# Patient Record
Sex: FEMALE | Race: WHITE | URBAN - METROPOLITAN AREA
[De-identification: names, ages, dates, MRNs, and addresses within clinical notes are randomized per-mention and may not be internally consistent; named-entity substitution may affect disease eponyms.]

---

## 2021-11-10 ENCOUNTER — OFFICE VISIT (OUTPATIENT)
Dept: URBAN - METROPOLITAN AREA CLINIC 54 | Facility: CLINIC | Age: 66
End: 2021-11-10

## 2021-11-12 ENCOUNTER — OFFICE VISIT (OUTPATIENT)
Dept: URBAN - METROPOLITAN AREA CLINIC 54 | Facility: CLINIC | Age: 66
End: 2021-11-12
Payer: MEDICARE

## 2021-11-12 ENCOUNTER — WEB ENCOUNTER (OUTPATIENT)
Dept: URBAN - METROPOLITAN AREA CLINIC 54 | Facility: CLINIC | Age: 66
End: 2021-11-12

## 2021-11-12 VITALS
HEART RATE: 94 BPM | WEIGHT: 114.4 LBS | DIASTOLIC BLOOD PRESSURE: 88 MMHG | TEMPERATURE: 98.6 F | SYSTOLIC BLOOD PRESSURE: 140 MMHG | BODY MASS INDEX: 17.96 KG/M2 | HEIGHT: 67 IN

## 2021-11-12 DIAGNOSIS — K92.1 HEMATOCHEZIA: ICD-10-CM

## 2021-11-12 DIAGNOSIS — R20.8 BURNING SENSATION OF RECTUM: ICD-10-CM

## 2021-11-12 DIAGNOSIS — R19.7 DIARRHEA: ICD-10-CM

## 2021-11-12 PROBLEM — 449341000124102: Status: ACTIVE | Noted: 2021-11-12

## 2021-11-12 PROBLEM — 119523007: Status: ACTIVE | Noted: 2021-11-12

## 2021-11-12 PROCEDURE — 99243 OFF/OP CNSLTJ NEW/EST LOW 30: CPT | Performed by: STUDENT IN AN ORGANIZED HEALTH CARE EDUCATION/TRAINING PROGRAM

## 2021-11-12 NOTE — HPI-RECTAL PAIN
Ms. Merry Cook is a pleasant 66 year old woman referred by Dr. Chivo Walker for rectal stinging and dark stools.  She complains of liquid diarrhea associated with coffee drinking. Improved after stopping coffee drinking and sweeteners and dairy.  Abdominal discomfort/pain associated with dairy intake and also urgency as well. In addition to using a toilet paper to wipe, she uses a washcloth as well wash her rectum after a bowel movement.   After discontinuing sweeteners and dairy products patient states the diarrhea has resolved.  However, as of a month ago, she has stinging sensation.  She states that it is not related to defecation. It will last for only seconds.  She is quite concerned that something bad is happening. She had a mother with pancreas cancer who passed away and a neighbor with rectal cancer  6 months ago and that she is very concerned that something bad might be happening to her.  On seldom occasions, she does endorse some blood when she wipes after defecation. This only happen when she has significant straining to stool. She had a colonoscopy in 2019 and it was normal per patient (no records currently available).  She states she has very dark brown stools on occasion, but denies black stools.

## 2021-11-12 NOTE — EXAM-FUNCTIONAL ASSESSMENT
General: Very Anxious. Under weight Cardiovascular: Normal S1 S2 heart sounds without murmur, no edema Respiratory: Breathing comfortably without conversational dyspnea Abdomen:  No surgical scars, non-distended, dull to percussion, normal bowel sounds, soft, non-tender Rectal: No visible skin tags or fissures seen/felt after careful inspection. Nontender to palpation over the enter anus and rectum after palpation with examining finger. Latanya sphincter tone. Skin: without visible rashes on examined areas. Musculoskeletal: underweight woman, ambulated without difficulty. Can stand from sitting position without assistance. Neuro: No focal deficits. Alert and oriented. Psych: Pressured speech. Noticeably anxious

## 2023-03-15 ENCOUNTER — OFFICE VISIT (OUTPATIENT)
Dept: URBAN - METROPOLITAN AREA CLINIC 54 | Facility: CLINIC | Age: 68
End: 2023-03-15

## 2023-05-25 ENCOUNTER — WEB ENCOUNTER (OUTPATIENT)
Dept: URBAN - METROPOLITAN AREA CLINIC 54 | Facility: CLINIC | Age: 68
End: 2023-05-25

## 2023-05-31 ENCOUNTER — LAB OUTSIDE AN ENCOUNTER (OUTPATIENT)
Dept: URBAN - METROPOLITAN AREA CLINIC 54 | Facility: CLINIC | Age: 68
End: 2023-05-31

## 2023-05-31 ENCOUNTER — OFFICE VISIT (OUTPATIENT)
Dept: URBAN - METROPOLITAN AREA CLINIC 54 | Facility: CLINIC | Age: 68
End: 2023-05-31
Payer: MEDICARE

## 2023-05-31 ENCOUNTER — DASHBOARD ENCOUNTERS (OUTPATIENT)
Age: 68
End: 2023-05-31

## 2023-05-31 ENCOUNTER — WEB ENCOUNTER (OUTPATIENT)
Dept: URBAN - METROPOLITAN AREA CLINIC 54 | Facility: CLINIC | Age: 68
End: 2023-05-31

## 2023-05-31 VITALS
WEIGHT: 117 LBS | BODY MASS INDEX: 18.36 KG/M2 | HEIGHT: 67 IN | SYSTOLIC BLOOD PRESSURE: 142 MMHG | TEMPERATURE: 97.8 F | HEART RATE: 106 BPM | DIASTOLIC BLOOD PRESSURE: 89 MMHG

## 2023-05-31 DIAGNOSIS — R13.19 ESOPHAGEAL DYSPHAGIA: ICD-10-CM

## 2023-05-31 DIAGNOSIS — J35.8 TONSILLAR CALCULUS: ICD-10-CM

## 2023-05-31 PROCEDURE — 99204 OFFICE O/P NEW MOD 45 MIN: CPT | Performed by: INTERNAL MEDICINE

## 2023-05-31 NOTE — HPI-TODAY'S VISIT:
Intermittent liquid dysphagia for carbonated beverages only   Recurrent tonsillar calculi that she sometimes manages by eating a toothbrush.  Has also tried Waterpik.  Has not seen an ENT physician.  She is worried that these cause bad breath.  No cardiac or respiratory problems.  Weight stable.  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  No problems swallowing pills or solids.  No heartburn or chest pain.  No prior upper endoscopic or chest imaging.  Prior colonoscopy 3 years ago was normal in [place].

## 2023-06-08 ENCOUNTER — TELEPHONE ENCOUNTER (OUTPATIENT)
Dept: RURAL CLINIC 6 | Facility: CLINIC | Age: 68
End: 2023-06-08

## 2023-06-21 ENCOUNTER — TELEPHONE ENCOUNTER (OUTPATIENT)
Dept: URBAN - METROPOLITAN AREA CLINIC 54 | Facility: CLINIC | Age: 68
End: 2023-06-21

## 2023-07-06 ENCOUNTER — TELEPHONE ENCOUNTER (OUTPATIENT)
Dept: URBAN - METROPOLITAN AREA CLINIC 54 | Facility: CLINIC | Age: 68
End: 2023-07-06

## 2023-07-21 ENCOUNTER — TELEPHONE ENCOUNTER (OUTPATIENT)
Dept: URBAN - METROPOLITAN AREA CLINIC 54 | Facility: CLINIC | Age: 68
End: 2023-07-21

## 2023-08-22 ENCOUNTER — OFFICE VISIT (OUTPATIENT)
Dept: RURAL CLINIC 6 | Facility: CLINIC | Age: 68
End: 2023-08-22

## 2024-05-08 ENCOUNTER — OFFICE VISIT (OUTPATIENT)
Dept: URBAN - METROPOLITAN AREA CLINIC 54 | Facility: CLINIC | Age: 69
End: 2024-05-08

## 2024-07-23 ENCOUNTER — OFFICE VISIT (OUTPATIENT)
Dept: RURAL CLINIC 6 | Facility: CLINIC | Age: 69
End: 2024-07-23
Payer: MEDICARE

## 2024-07-23 ENCOUNTER — LAB OUTSIDE AN ENCOUNTER (OUTPATIENT)
Dept: RURAL CLINIC 6 | Facility: CLINIC | Age: 69
End: 2024-07-23

## 2024-07-23 VITALS
SYSTOLIC BLOOD PRESSURE: 131 MMHG | HEIGHT: 67 IN | DIASTOLIC BLOOD PRESSURE: 76 MMHG | WEIGHT: 110 LBS | TEMPERATURE: 98.4 F | HEART RATE: 77 BPM | BODY MASS INDEX: 17.27 KG/M2

## 2024-07-23 DIAGNOSIS — R19.7 DIARRHEA: ICD-10-CM

## 2024-07-23 DIAGNOSIS — R13.19 ESOPHAGEAL DYSPHAGIA: ICD-10-CM

## 2024-07-23 PROCEDURE — 99203 OFFICE O/P NEW LOW 30 MIN: CPT | Performed by: INTERNAL MEDICINE

## 2024-07-23 RX ORDER — OMEPRAZOLE 40 MG/1
1 CAPSULE 30 MINUTES BEFORE MORNING MEAL CAPSULE, DELAYED RELEASE ORAL ONCE A DAY
Status: DISCONTINUED | COMMUNITY

## 2024-07-23 RX ORDER — FAMOTIDINE 20 MG/1
1 TABLET AT BEDTIME AS NEEDED TABLET, FILM COATED ORAL ONCE A DAY
Status: DISCONTINUED | COMMUNITY

## 2024-07-23 RX ORDER — ONDANSETRON HYDROCHLORIDE 4 MG/1
1 TABLET TABLET, FILM COATED ORAL ONCE A DAY
Status: DISCONTINUED | COMMUNITY

## 2024-07-23 RX ORDER — AZELASTINE HYDROCHLORIDE 137 UG/1
1 PUFF IN EACH NOSTRIL SPRAY, METERED NASAL TWICE A DAY
Status: DISCONTINUED | COMMUNITY

## 2024-07-23 NOTE — HPI-TODAY'S VISIT:
Female, age 69.  She has intermittent rare episodes of difficulty swallowing with liquids only and regurgitation immediately after swallowing.  This is not accompanying chest pain.  A barium swallow done last year revealed mildly disordered motility.  No prior upper endoscopy.  No difficulty swallowing solids.  No nasal regurgitation.  No neurological symptoms.  History of intermittent fecal urgency followed by loose stools.  No rectal bleeding.  Recent labs with normal CBC CMP T4 and borderline low TSH of 5.54.  No history of liver disease.  No abdominal pain.  No cardiac or respiratory problems.  Negative colonoscopy about 5 or 6 years ago.

## 2024-08-22 ENCOUNTER — TELEPHONE ENCOUNTER (OUTPATIENT)
Dept: URBAN - METROPOLITAN AREA CLINIC 54 | Facility: CLINIC | Age: 69
End: 2024-08-22

## 2024-08-28 ENCOUNTER — TELEPHONE ENCOUNTER (OUTPATIENT)
Dept: URBAN - METROPOLITAN AREA CLINIC 54 | Facility: CLINIC | Age: 69
End: 2024-08-28

## 2024-08-30 ENCOUNTER — TELEPHONE ENCOUNTER (OUTPATIENT)
Dept: URBAN - METROPOLITAN AREA CLINIC 54 | Facility: CLINIC | Age: 69
End: 2024-08-30

## 2024-09-09 ENCOUNTER — OFFICE VISIT (OUTPATIENT)
Dept: URBAN - METROPOLITAN AREA SURGERY CENTER 14 | Facility: SURGERY CENTER | Age: 69
End: 2024-09-09
Payer: MEDICARE

## 2024-09-09 ENCOUNTER — CLAIMS CREATED FROM THE CLAIM WINDOW (OUTPATIENT)
Dept: URBAN - METROPOLITAN AREA CLINIC 4 | Facility: CLINIC | Age: 69
End: 2024-09-09
Payer: MEDICARE

## 2024-09-09 DIAGNOSIS — K63.5 HYPERPLASTIC COLON POLYPS: ICD-10-CM

## 2024-09-09 DIAGNOSIS — K63.5 BENIGN COLON POLYP: ICD-10-CM

## 2024-09-09 DIAGNOSIS — R13.19 DYSPHAGIA: ICD-10-CM

## 2024-09-09 DIAGNOSIS — K63.89 OTHER SPECIFIED DISEASES OF INTESTINE: ICD-10-CM

## 2024-09-09 DIAGNOSIS — K31.89 OTHER DISEASES OF STOMACH AND DUODENUM: ICD-10-CM

## 2024-09-09 DIAGNOSIS — K21.9 GASTRO-ESOPHAGEAL REFLUX DISEASE WITHOUT ESOPHAGITIS: ICD-10-CM

## 2024-09-09 DIAGNOSIS — K21.9 GASTROESOPHAGEAL REFLUX DISEASE: ICD-10-CM

## 2024-09-09 DIAGNOSIS — K29.70 GASTRITIS, UNSPECIFIED, WITHOUT BLEEDING: ICD-10-CM

## 2024-09-09 DIAGNOSIS — R10.13 EPIGASTRIC ABDOMINAL PAIN: ICD-10-CM

## 2024-09-09 DIAGNOSIS — K21.9 GASTRO - ESOPHAGEAL REFLUX DISEASE: ICD-10-CM

## 2024-09-09 DIAGNOSIS — R19.7 DIARRHEA: ICD-10-CM

## 2024-09-09 DIAGNOSIS — K29.70 REACTIVE GASTROPATHY: ICD-10-CM

## 2024-09-09 PROCEDURE — 43239 EGD BIOPSY SINGLE/MULTIPLE: CPT | Performed by: CLINIC/CENTER

## 2024-09-09 PROCEDURE — 43239 EGD BIOPSY SINGLE/MULTIPLE: CPT | Performed by: INTERNAL MEDICINE

## 2024-09-09 PROCEDURE — 88305 TISSUE EXAM BY PATHOLOGIST: CPT | Performed by: PATHOLOGY

## 2024-09-09 PROCEDURE — 00813 ANES UPR LWR GI NDSC PX: CPT | Performed by: NURSE ANESTHETIST, CERTIFIED REGISTERED

## 2024-09-09 PROCEDURE — 43249 ESOPH EGD DILATION <30 MM: CPT | Performed by: INTERNAL MEDICINE

## 2024-09-09 PROCEDURE — 45385 COLONOSCOPY W/LESION REMOVAL: CPT | Performed by: INTERNAL MEDICINE

## 2024-09-09 PROCEDURE — 43249 ESOPH EGD DILATION <30 MM: CPT | Performed by: CLINIC/CENTER

## 2024-09-09 PROCEDURE — 88312 SPECIAL STAINS GROUP 1: CPT | Performed by: PATHOLOGY

## 2024-09-09 PROCEDURE — 45385 COLONOSCOPY W/LESION REMOVAL: CPT | Performed by: CLINIC/CENTER
